# Patient Record
Sex: FEMALE | Race: WHITE | NOT HISPANIC OR LATINO | URBAN - METROPOLITAN AREA
[De-identification: names, ages, dates, MRNs, and addresses within clinical notes are randomized per-mention and may not be internally consistent; named-entity substitution may affect disease eponyms.]

---

## 2017-01-27 ENCOUNTER — OFFICE VISIT - RIVER FALLS (OUTPATIENT)
Dept: FAMILY MEDICINE | Facility: CLINIC | Age: 42
End: 2017-01-27

## 2017-06-13 ENCOUNTER — OFFICE VISIT - RIVER FALLS (OUTPATIENT)
Dept: FAMILY MEDICINE | Facility: CLINIC | Age: 42
End: 2017-06-13

## 2017-06-13 ASSESSMENT — MIFFLIN-ST. JEOR: SCORE: 1434.46

## 2022-02-11 VITALS
SYSTOLIC BLOOD PRESSURE: 118 MMHG | DIASTOLIC BLOOD PRESSURE: 70 MMHG | TEMPERATURE: 98.2 F | WEIGHT: 168 LBS | HEART RATE: 80 BPM

## 2022-02-12 VITALS
WEIGHT: 164 LBS | HEART RATE: 64 BPM | TEMPERATURE: 98.7 F | DIASTOLIC BLOOD PRESSURE: 76 MMHG | SYSTOLIC BLOOD PRESSURE: 122 MMHG | HEIGHT: 68 IN | BODY MASS INDEX: 24.86 KG/M2 | RESPIRATION RATE: 16 BRPM

## 2022-02-16 NOTE — PROGRESS NOTES
Patient:   GEOVANY BEE            MRN: [id number]            FIN: [id number]               Age:   41 years     Sex:  Female     :  1975   Associated Diagnoses:   FH: type 2 diabetes; Recent weight gain   Author:   Day Fulton      Subjective   Chief complaint 2017 2:37 PM CST    Weight gain, primarily in stomach and breasts.  Concern about hormone..        Health Status   Allergies:    Allergic Reactions (Selected)  No known allergies   Medications:  (Selected)   Prescriptions  Prescribed  Multiple Vitamins oral tablet: 1 tab(s), PO, Daily, # 90 tab(s), 0 Refill(s), Type: Maintenance  OPC-3: OPC-3, See Instructions, Instructions: Take 1-3 capful prn, Supply, # 1 EA, 0 Refill(s), Type: Maintenance  TriNessa oral tablet: 1 tab(s), po, daily, # 84 tab(s), 3 Refill(s), Type: Maintenance, Pharmacy: [id number], 1 tab(s) po daily,x84 day(s)  Vitamin B Complex oral tablet: 1 tab(s), PO, Daily, # 90 tab(s), 0 Refill(s), Type: Maintenance  calcium carbonate 1000 mg oral powder: 1 EA ( 1,000 mg ), po, 7x/day, # 210 EA, 0 Refill(s), Type: Maintenance  magnesium aspartate 1230 mg (122 mg magnesium) oral powder for reconstitution: 1 EA ( 1,230 mg ), po, tid, # 90 EA, 0 Refill(s), Type: Maintenance  valACYclovir 1 g oral tablet: See Instructions, Instructions: 2 tablets once and repeat in 12 hours, # 30 tab(s), 0 Refill(s), Type: Maintenance, Pharmacy: [id number], 2 tablets once and repeat in 12 hours  Documented Medications  Documented  Vitamin C: po, daily, 0 Refill(s), Type: Maintenance   Problem list:    All Problems (Selected)  ASCUS on PAP Smear / ICD-9-.01 / Confirmed  Oral Herpes Simplex Infection / ICD-9-.2 / Confirmed  Seasonal Allergies / ICD-9-.9 / Confirmed       PPC to discuss her weight gain issues, she is worried it could be hormonal  weight gain over past 12 months:   diet: shake for breakfast and a shake for lunch,    no soda, weekly basis for  alcohol: 1-2 nights a week:  two or three beers  exercise: started working out at beginning of January with three days a week  she feels like her breasts are larger and she is bloated, she has taken three home pg tests (not trying to get pg), all negative         Objective   Vital Signs   1/27/2017 2:37 PM CST Temperature Tympanic 98.2 DegF    Peripheral Pulse Rate 80 bpm    Systolic Blood Pressure 118 mmHg    Diastolic Blood Pressure 70 mmHg    Mean Arterial Pressure 86 mmHg      General:  Alert and oriented, No acute distress.    Eye:  Pupils are equal, round and reactive to light.    Neck:  Supple.    Respiratory:  Respirations are non-labored.    Cardiovascular:  Regular rhythm.    Gastrointestinal:  Soft, Non-tender, Non-distended, Normal bowel sounds.    Integumentary:  Warm, Dry, Pink.    Neurologic:  Alert, Oriented, Normal sensory.    Psychiatric:  Cooperative, Appropriate mood & affect, Normal judgment.       Results Review   Results review   ordered hgba1c , tsh and free t4      Impression and Plan   Assessment and Plan:          Diagnosis: FH: type 2 diabetes (UQQ17-CQ Z83.3), Recent weight gain (HLO16-HS R63.5).         Course: we will check hgba1c and thyroid function  if these are negative we can include our nutritionalist, Donya Adams.  she will consider this  realistically she needs to concentrate on exercise and caloric intake.

## 2022-02-16 NOTE — PROGRESS NOTES
"Chief Complaint  Pt here for itchy rash that started on back and left leg and x 3-4 days. Pt also has a \"inflamed\" skin on back of head x 3 weeks.  History of Present Illness  Patient is here for a rash?left low back which is been pruritic and slightly painful. ?This is been present for last 3 or 4 days. ?She also has a spot on the left leg is more blister-like. ?She?is also had erythematous, pruritic area on the left posterior scalp. ?She would like a refill on valacyclovir for her recurrent herpes labialis. ?She denies any new exposures. ?She had increased stressors lately with her  in the hospital.  Review of Systems  See HPI. ?All other review of systems negative.  Problem List/Past Medical History  Ongoing  ASCUS on PAP Smear  Oral Herpes Simplex Infection  Seasonal Allergies  Resolved  Pregnancy  Procedure/Surgical History   section (2012).  Home Medications  calcium carbonate 1000 mg oral powder, 1000 mg, 1 EA, po, 7x/day  hydrocortisone 2.5% topical solution, 1 tom, top, bid  magnesium aspartate 1230 mg (122 mg magnesium) oral powder for reconstitution, 1230 mg, 1 EA, po, tid  Multiple Vitamins oral tablet, 1 tab(s), po, daily  OPC-3  TriNessa oral tablet, 1 tab(s), po, daily, 3 refills  valACYclovir 1 g oral tablet, 1 refills  Vitamin B Complex oral tablet, 1 tab(s), po, daily  Vitamin C, po, daily  Allergies  No known allergies  Social History  Alcohol  1-2 times per week  Exercise and Physical Activity  Exercise frequency: 3-4 times a wk.. Exercise type: Aerobics, Weight lifting.  Substance Abuse  Never  Tobacco  Never  Family History  Breast cancer: Aunt (M).  CA - Lung cancer: Grandmother (P).  Diabetes mellitus: Father.  Gout: Father.  Heart disease: Grandfather (P).  Osteoporosis: Mother.  Skin cancer: Father.  Physical Exam  Vitals & Measurements  T:?98.7?(Tympanic)?  HR:?64?(Peripheral)?  RR:?16?  BP:?122/76?  HT:?67.5?in?  WT:?164?lb?  BMI:?25.3?  Alert, oriented, no acute " distress  Eczematous, scaling, erythematous patch?left posterior scalp at the hairline  Erythematous patch left low back  6 mm vesicular lesion of distal thigh  Assessment/Plan  Oral Herpes Simplex Infection  Refill valacyclovir  Scalp psoriasis  Hydrocortisone topical solution applied twice daily until clear  Discussed natural history of scalp psoriasis  Ordered:  hydrocortisone topical, 1 tom, top, bid, # 30 mL, 0 Refill(s), Type: Soft Stop, Pharmacy: [id number], 1 tom top bid  Vesicular eczematous dermatitis  Topical hydrocortisone cream  Entertained possible mild herpes zoster as a diagnosis given her recent stressors although is fairly atypical for that  Orders:  valACYclovir, See Instructions, Instructions: 2 tablets once and repeat in 12 hours, # 30 tab(s), 1 Refill(s), Type: Soft Stop, Pharmacy: [id number], 2 tablets once and repeat in 12 hours